# Patient Record
Sex: FEMALE | URBAN - METROPOLITAN AREA
[De-identification: names, ages, dates, MRNs, and addresses within clinical notes are randomized per-mention and may not be internally consistent; named-entity substitution may affect disease eponyms.]

---

## 2019-11-06 ENCOUNTER — APPOINTMENT (OUTPATIENT)
Age: 69
Setting detail: DERMATOLOGY
End: 2019-11-07

## 2019-11-06 DIAGNOSIS — Z80.8 FAMILY HISTORY OF MALIGNANT NEOPLASM OF OTHER ORGANS OR SYSTEMS: ICD-10-CM

## 2019-11-06 DIAGNOSIS — Z71.89 OTHER SPECIFIED COUNSELING: ICD-10-CM

## 2019-11-06 DIAGNOSIS — L82.1 OTHER SEBORRHEIC KERATOSIS: ICD-10-CM

## 2019-11-06 DIAGNOSIS — D18.0 HEMANGIOMA: ICD-10-CM

## 2019-11-06 DIAGNOSIS — B35.1 TINEA UNGUIUM: ICD-10-CM

## 2019-11-06 DIAGNOSIS — L82.0 INFLAMED SEBORRHEIC KERATOSIS: ICD-10-CM

## 2019-11-06 DIAGNOSIS — L81.4 OTHER MELANIN HYPERPIGMENTATION: ICD-10-CM

## 2019-11-06 PROBLEM — D18.01 HEMANGIOMA OF SKIN AND SUBCUTANEOUS TISSUE: Status: ACTIVE | Noted: 2019-11-06

## 2019-11-06 PROCEDURE — OTHER RECOMMENDATIONS: OTHER

## 2019-11-06 PROCEDURE — OTHER MIPS QUALITY: OTHER

## 2019-11-06 PROCEDURE — OTHER COUNSELING: OTHER

## 2019-11-06 PROCEDURE — OTHER LIQUID NITROGEN: OTHER

## 2019-11-06 PROCEDURE — OTHER DEFER: OTHER

## 2019-11-06 PROCEDURE — OTHER REASSURANCE: OTHER

## 2019-11-06 PROCEDURE — 17110 DESTRUCT B9 LESION 1-14: CPT

## 2019-11-06 PROCEDURE — 99203 OFFICE O/P NEW LOW 30 MIN: CPT | Mod: 25

## 2019-11-06 ASSESSMENT — LOCATION ZONE DERM
LOCATION ZONE: TRUNK
LOCATION ZONE: FACE
LOCATION ZONE: TOENAIL
LOCATION ZONE: ARM

## 2019-11-06 ASSESSMENT — LOCATION SIMPLE DESCRIPTION DERM
LOCATION SIMPLE: LEFT UPPER BACK
LOCATION SIMPLE: RIGHT FOREHEAD
LOCATION SIMPLE: LEFT FOREARM
LOCATION SIMPLE: RIGHT UPPER BACK
LOCATION SIMPLE: RIGHT SHOULDER
LOCATION SIMPLE: RIGHT 2ND TOE
LOCATION SIMPLE: LEFT 2ND TOE
LOCATION SIMPLE: RIGHT FOREARM

## 2019-11-06 ASSESSMENT — LOCATION DETAILED DESCRIPTION DERM
LOCATION DETAILED: RIGHT 2ND TOENAIL
LOCATION DETAILED: LEFT MID-UPPER BACK
LOCATION DETAILED: LEFT DISTAL DORSAL FOREARM
LOCATION DETAILED: LEFT 2ND TOENAIL
LOCATION DETAILED: RIGHT POSTERIOR SHOULDER
LOCATION DETAILED: RIGHT SUPERIOR UPPER BACK
LOCATION DETAILED: RIGHT DISTAL DORSAL FOREARM
LOCATION DETAILED: RIGHT FOREHEAD
LOCATION DETAILED: RIGHT INFERIOR MEDIAL UPPER BACK

## 2019-11-06 NOTE — PROCEDURE: RECOMMENDATIONS
Detail Level: Zone
Recommendation Preamble: The following recommendations were made during the visit:
Recommendations (Free Text): 50/50 water and vinegar soaks x 5 minutes 3-4 nights weekly. Patient brought me a compound medication that her pharmacist at [facility] said they can compound. Patient deferred me sending rx for this compound at this time.

## 2019-11-06 NOTE — PROCEDURE: MIPS QUALITY
Name And Contact Information For Health Care Proxy: Spouse Daniel gonzalez [phone] Name And Contact Information For Health Care Proxy: Spouse Daniel gonzalez [phone]

## 2019-11-06 NOTE — PROCEDURE: LIQUID NITROGEN
Include Z78.9 (Other Specified Conditions Influencing Health Status) As An Associated Diagnosis?: No
Consent: The patient's consent was obtained including but not limited to risks of crusting, scabbing, blistering, scarring, darker or lighter pigmentary change, recurrence, incomplete removal and infection.
Number Of Freeze-Thaw Cycles: 3 freeze-thaw cycles
Duration Of Freeze Thaw-Cycle (Seconds): 5-10
Detail Level: Detailed
Post-Care Instructions: I reviewed with the patient in detail post-care instructions. Patient is to wear sunprotection, and avoid picking at any of the treated lesions. Pt may apply Vaseline to crusted or scabbing areas.
Medical Necessity Information: It is in your best interest to select a reason for this procedure from the list below. All of these items fulfill various CMS LCD requirements except the new and changing color options.
Medical Necessity Clause: This procedure was medically necessary because the lesions that were treated were:

## 2020-02-12 ENCOUNTER — APPOINTMENT (OUTPATIENT)
Age: 70
Setting detail: DERMATOLOGY
End: 2020-02-13

## 2020-02-12 DIAGNOSIS — Z71.89 OTHER SPECIFIED COUNSELING: ICD-10-CM

## 2020-02-12 DIAGNOSIS — M71 OTHER BURSOPATHIES: ICD-10-CM

## 2020-02-12 DIAGNOSIS — R21 RASH AND OTHER NONSPECIFIC SKIN ERUPTION: ICD-10-CM

## 2020-02-12 DIAGNOSIS — L71.8 OTHER ROSACEA: ICD-10-CM

## 2020-02-12 PROBLEM — M71.342 OTHER BURSAL CYST, LEFT HAND: Status: ACTIVE | Noted: 2020-02-12

## 2020-02-12 PROCEDURE — 11103 TANGNTL BX SKIN EA SEP/ADDL: CPT

## 2020-02-12 PROCEDURE — OTHER ADDITIONAL NOTES: OTHER

## 2020-02-12 PROCEDURE — OTHER MIPS QUALITY: OTHER

## 2020-02-12 PROCEDURE — OTHER PRESCRIPTION: OTHER

## 2020-02-12 PROCEDURE — 99214 OFFICE O/P EST MOD 30 MIN: CPT | Mod: 25

## 2020-02-12 PROCEDURE — 11102 TANGNTL BX SKIN SINGLE LES: CPT

## 2020-02-12 PROCEDURE — OTHER BIOPSY BY SHAVE METHOD: OTHER

## 2020-02-12 PROCEDURE — OTHER COUNSELING: OTHER

## 2020-02-12 PROCEDURE — OTHER TREATMENT REGIMEN: OTHER

## 2020-02-12 RX ORDER — METRONIDAZOLE 7.5 MG/G
CREAM TOPICAL
Qty: 3 | Refills: 2 | Status: ERX | COMMUNITY
Start: 2020-02-12

## 2020-02-12 ASSESSMENT — LOCATION DETAILED DESCRIPTION DERM
LOCATION DETAILED: LEFT DISTAL DORSAL INDEX FINGER
LOCATION DETAILED: LEFT DISTAL PLANTAR GREAT TOE
LOCATION DETAILED: LEFT DORSAL 2ND TOE
LOCATION DETAILED: RIGHT MEDIAL MALAR CHEEK
LOCATION DETAILED: LEFT MEDIAL MALAR CHEEK

## 2020-02-12 ASSESSMENT — LOCATION ZONE DERM
LOCATION ZONE: TOE
LOCATION ZONE: FINGER
LOCATION ZONE: FACE

## 2020-02-12 ASSESSMENT — LOCATION SIMPLE DESCRIPTION DERM
LOCATION SIMPLE: LEFT 2ND TOE
LOCATION SIMPLE: LEFT CHEEK
LOCATION SIMPLE: LEFT INDEX FINGER
LOCATION SIMPLE: LEFT GREAT TOE
LOCATION SIMPLE: RIGHT CHEEK

## 2020-02-12 NOTE — PROCEDURE: MIPS QUALITY
Name And Contact Information For Health Care Proxy: Spouse Daniel gonzalez [phone] Name And Contact Information For Health Care Proxy: Spouse Daniel gonzalez [phone]

## 2020-02-12 NOTE — PROCEDURE: BIOPSY BY SHAVE METHOD
Type Of Destruction Used: Curettage
Detail Level: Detailed
Silver Nitrate Text: The wound bed was treated with silver nitrate after the biopsy was performed.
Hide Topical Anesthesia?: No
Billing Type: Third-Party Bill
Size Of Lesion In Cm: 0
Anesthesia Type: 1% lidocaine with epinephrine
Anesthesia Volume In Cc (Will Not Render If 0): 0.5
Electrodesiccation And Curettage Text: The wound bed was treated with electrodesiccation and curettage after the biopsy was performed.
Depth Of Biopsy: dermis
Was A Bandage Applied: Yes
Dressing: bandage
Wound Care: Petrolatum
Consent: Written consent was obtained and risks were reviewed including but not limited to scarring, infection, bleeding, scabbing, incomplete removal, nerve damage and allergy to anesthesia.
Post-Care Instructions: I reviewed with the patient in detail post-care instructions. Patient is to keep the biopsy site dry overnight, and then apply bacitracin twice daily until healed. Patient may apply hydrogen peroxide soaks to remove any crusting.
Hemostasis: Aluminum Chloride
Biopsy Type: H and E
Notification Instructions: Patient will be notified of biopsy results. However, patient instructed to call the office if not contacted within 2 weeks.
Cryotherapy Text: The wound bed was treated with cryotherapy after the biopsy was performed.
Curettage Text: The wound bed was treated with curettage after the biopsy was performed.
Electrodesiccation Text: The wound bed was treated with electrodesiccation after the biopsy was performed.
Biopsy Method: Personna blade

## 2020-02-12 NOTE — PROCEDURE: ADDITIONAL NOTES
Detail Level: Simple
Additional Notes: Patient states rash is asymptomatic and she is unsure how long rash has been present. She states her rheumatologist Dr. Riley would like a biopsy of the rash to rule out “a rare form of arthritis”. Will wait for results of biopsy to determine treatment plan.
Additional Notes: Patient prefers a shave biopsy over a punch biopsy. I did advise patient that we may need to eventually do a punch biopsy if the shave biopsy does not provide enough information to the pathologist.
Additional Notes: Patient states her rheumatologist states that she may have a rare form of arthritis. Patient deferred punch biopsy today. Advised patient to be evaluated by an orthopedic hand surgeon for possible excisional biopsy.

## 2020-11-09 ENCOUNTER — APPOINTMENT (OUTPATIENT)
Age: 70
Setting detail: DERMATOLOGY
End: 2020-11-10

## 2020-11-09 DIAGNOSIS — D18.0 HEMANGIOMA: ICD-10-CM

## 2020-11-09 DIAGNOSIS — L81.4 OTHER MELANIN HYPERPIGMENTATION: ICD-10-CM

## 2020-11-09 DIAGNOSIS — L82.1 OTHER SEBORRHEIC KERATOSIS: ICD-10-CM

## 2020-11-09 DIAGNOSIS — Z80.8 FAMILY HISTORY OF MALIGNANT NEOPLASM OF OTHER ORGANS OR SYSTEMS: ICD-10-CM

## 2020-11-09 DIAGNOSIS — L57.0 ACTINIC KERATOSIS: ICD-10-CM

## 2020-11-09 DIAGNOSIS — Z71.89 OTHER SPECIFIED COUNSELING: ICD-10-CM

## 2020-11-09 PROBLEM — D18.01 HEMANGIOMA OF SKIN AND SUBCUTANEOUS TISSUE: Status: ACTIVE | Noted: 2020-11-09

## 2020-11-09 PROCEDURE — OTHER REASSURANCE: OTHER

## 2020-11-09 PROCEDURE — 99213 OFFICE O/P EST LOW 20 MIN: CPT | Mod: 25

## 2020-11-09 PROCEDURE — 17000 DESTRUCT PREMALG LESION: CPT

## 2020-11-09 PROCEDURE — OTHER LIQUID NITROGEN: OTHER

## 2020-11-09 PROCEDURE — OTHER COUNSELING: OTHER

## 2020-11-09 PROCEDURE — OTHER MIPS QUALITY: OTHER

## 2020-11-09 ASSESSMENT — LOCATION SIMPLE DESCRIPTION DERM
LOCATION SIMPLE: LEFT UPPER BACK
LOCATION SIMPLE: LEFT FOREARM
LOCATION SIMPLE: RIGHT FOREARM
LOCATION SIMPLE: SCALP
LOCATION SIMPLE: RIGHT UPPER BACK

## 2020-11-09 ASSESSMENT — TOTAL NUMBER OF LESIONS: # OF LESIONS?: 1

## 2020-11-09 ASSESSMENT — LOCATION DETAILED DESCRIPTION DERM
LOCATION DETAILED: LEFT MID-UPPER BACK
LOCATION DETAILED: LEFT DISTAL DORSAL FOREARM
LOCATION DETAILED: RIGHT INFERIOR MEDIAL UPPER BACK
LOCATION DETAILED: RIGHT SUPERIOR UPPER BACK
LOCATION DETAILED: LEFT INFERIOR OCCIPITAL SCALP
LOCATION DETAILED: RIGHT DISTAL DORSAL FOREARM

## 2020-11-09 ASSESSMENT — LOCATION ZONE DERM
LOCATION ZONE: ARM
LOCATION ZONE: TRUNK
LOCATION ZONE: SCALP

## 2020-11-09 NOTE — PROCEDURE: LIQUID NITROGEN
Detail Level: Detailed
Duration Of Freeze Thaw-Cycle (Seconds): 5
Post-Care Instructions: I reviewed with the patient in detail post-care instructions. Patient is to wear sunprotection, and avoid picking at any of the treated lesions. Pt may apply Vaseline to crusted or scabbing areas.
Render Post-Care Instructions In Note?: no
Consent: The patient's consent was obtained including but not limited to risks of crusting, scabbing, blistering, scarring, darker or lighter pigmentary change, recurrence, incomplete removal and infection.
Number Of Freeze-Thaw Cycles: 3 freeze-thaw cycles

## 2020-11-09 NOTE — PROCEDURE: MIPS QUALITY
Name And Contact Information For Health Care Proxy: Spouse Daniel gonzalez [phone] Name And Contact Information For Health Care Proxy: Spouse Daniel gonzalez [phone]

## 2021-11-13 ENCOUNTER — RX ONLY (RX ONLY)
Age: 71
End: 2021-11-13

## 2021-12-13 ENCOUNTER — APPOINTMENT (OUTPATIENT)
Dept: URBAN - METROPOLITAN AREA CLINIC 277 | Age: 71
Setting detail: DERMATOLOGY
End: 2021-12-13

## 2021-12-13 DIAGNOSIS — L26 EXFOLIATIVE DERMATITIS: ICD-10-CM

## 2021-12-13 DIAGNOSIS — D18.0 HEMANGIOMA: ICD-10-CM

## 2021-12-13 DIAGNOSIS — L72.0 EPIDERMAL CYST: ICD-10-CM

## 2021-12-13 DIAGNOSIS — Z71.89 OTHER SPECIFIED COUNSELING: ICD-10-CM

## 2021-12-13 PROBLEM — D18.01 HEMANGIOMA OF SKIN AND SUBCUTANEOUS TISSUE: Status: ACTIVE | Noted: 2021-12-13

## 2021-12-13 PROCEDURE — OTHER OBSERVATION: OTHER

## 2021-12-13 PROCEDURE — OTHER COUNSELING: OTHER

## 2021-12-13 PROCEDURE — OTHER MIPS QUALITY: OTHER

## 2021-12-13 PROCEDURE — OTHER SUNSCREEN RECOMMENDATIONS: OTHER

## 2021-12-13 PROCEDURE — OTHER TREATMENT REGIMEN: OTHER

## 2021-12-13 PROCEDURE — 99213 OFFICE O/P EST LOW 20 MIN: CPT

## 2021-12-13 ASSESSMENT — LOCATION DETAILED DESCRIPTION DERM
LOCATION DETAILED: RIGHT ANTERIOR SHOULDER
LOCATION DETAILED: LEFT MEDIAL PLANTAR MIDFOOT
LOCATION DETAILED: LEFT MEDIAL SUPERIOR CHEST
LOCATION DETAILED: INFERIOR THORACIC SPINE
LOCATION DETAILED: RIGHT MEDIAL PLANTAR MIDFOOT

## 2021-12-13 ASSESSMENT — LOCATION SIMPLE DESCRIPTION DERM
LOCATION SIMPLE: RIGHT PLANTAR SURFACE
LOCATION SIMPLE: CHEST
LOCATION SIMPLE: LEFT PLANTAR SURFACE
LOCATION SIMPLE: RIGHT SHOULDER
LOCATION SIMPLE: UPPER BACK

## 2021-12-13 ASSESSMENT — LOCATION ZONE DERM
LOCATION ZONE: FEET
LOCATION ZONE: ARM
LOCATION ZONE: TRUNK

## 2021-12-13 NOTE — PROCEDURE: MIPS QUALITY
Name And Contact Information For Health Care Proxy: Spouse Daniel gonzalez [phone] Name And Contact Information For Health Care Proxy: Spouse Daniel gonzalez [phone]

## 2023-01-09 ENCOUNTER — APPOINTMENT (OUTPATIENT)
Dept: URBAN - METROPOLITAN AREA CLINIC 277 | Age: 73
Setting detail: DERMATOLOGY
End: 2023-01-09

## 2023-01-09 DIAGNOSIS — L81.4 OTHER MELANIN HYPERPIGMENTATION: ICD-10-CM

## 2023-01-09 DIAGNOSIS — Z71.89 OTHER SPECIFIED COUNSELING: ICD-10-CM

## 2023-01-09 DIAGNOSIS — D485 NEOPLASM OF UNCERTAIN BEHAVIOR OF SKIN: ICD-10-CM

## 2023-01-09 DIAGNOSIS — L57.8 OTHER SKIN CHANGES DUE TO CHRONIC EXPOSURE TO NONIONIZING RADIATION: ICD-10-CM

## 2023-01-09 DIAGNOSIS — D22 MELANOCYTIC NEVI: ICD-10-CM

## 2023-01-09 DIAGNOSIS — L82.1 OTHER SEBORRHEIC KERATOSIS: ICD-10-CM

## 2023-01-09 PROBLEM — D22.9 MELANOCYTIC NEVI, UNSPECIFIED: Status: ACTIVE | Noted: 2023-01-09

## 2023-01-09 PROBLEM — D48.5 NEOPLASM OF UNCERTAIN BEHAVIOR OF SKIN: Status: ACTIVE | Noted: 2023-01-09

## 2023-01-09 PROCEDURE — 11102 TANGNTL BX SKIN SINGLE LES: CPT

## 2023-01-09 PROCEDURE — 99213 OFFICE O/P EST LOW 20 MIN: CPT | Mod: 25

## 2023-01-09 PROCEDURE — OTHER MIPS QUALITY: OTHER

## 2023-01-09 PROCEDURE — OTHER COUNSELING: OTHER

## 2023-01-09 PROCEDURE — OTHER BIOPSY BY SHAVE METHOD: OTHER

## 2023-01-09 PROCEDURE — OTHER SUNSCREEN RECOMMENDATIONS: OTHER

## 2023-01-09 ASSESSMENT — LOCATION SIMPLE DESCRIPTION DERM
LOCATION SIMPLE: RIGHT CHEEK
LOCATION SIMPLE: RIGHT UPPER BACK
LOCATION SIMPLE: RIGHT ZYGOMA
LOCATION SIMPLE: INFERIOR FOREHEAD
LOCATION SIMPLE: LEFT ZYGOMA

## 2023-01-09 ASSESSMENT — LOCATION DETAILED DESCRIPTION DERM
LOCATION DETAILED: RIGHT LATERAL ZYGOMA
LOCATION DETAILED: RIGHT CENTRAL MALAR CHEEK
LOCATION DETAILED: INFERIOR MID FOREHEAD
LOCATION DETAILED: LEFT CENTRAL ZYGOMA
LOCATION DETAILED: RIGHT SUPERIOR UPPER BACK

## 2023-01-09 ASSESSMENT — LOCATION ZONE DERM
LOCATION ZONE: FACE
LOCATION ZONE: TRUNK

## 2023-01-09 NOTE — PROCEDURE: BIOPSY BY SHAVE METHOD
Cryotherapy Text: The wound bed was treated with cryotherapy after the biopsy was performed.
Billing Type: Third-Party Bill
Anesthesia Type: 1% lidocaine with epinephrine
Validate Triangulation: No
Was A Bandage Applied: Yes
Silver Nitrate Text: The wound bed was treated with silver nitrate after the biopsy was performed.
Wound Care: Petrolatum
Biopsy Method: Dermablade
Type Of Destruction Used: Curettage
X Size Of Lesion In Cm: 0
Curettage Text: The wound bed was treated with curettage after the biopsy was performed.
Detail Level: Detailed
Consent: Written consent was obtained and risks were reviewed including but not limited to scarring, infection, bleeding, scabbing, incomplete removal, nerve damage and allergy to anesthesia.
Information: Selecting Yes will display possible errors in your note based on the variables you have selected. This validation is only offered as a suggestion for you. PLEASE NOTE THAT THE VALIDATION TEXT WILL BE REMOVED WHEN YOU FINALIZE YOUR NOTE. IF YOU WANT TO FAX A PRELIMINARY NOTE YOU WILL NEED TO TOGGLE THIS TO 'NO' IF YOU DO NOT WANT IT IN YOUR FAXED NOTE.
Dressing: bandage
Notification Instructions: Patient will be notified of biopsy results. However, patient instructed to call the office if not contacted within 2 weeks.
Anesthesia Volume In Cc (Will Not Render If 0): 0.5
Electrodesiccation And Curettage Text: The wound bed was treated with electrodesiccation and curettage after the biopsy was performed.
Post-Care Instructions: I reviewed with the patient in detail post-care instructions. Patient is to keep the biopsy site dry overnight, and then apply bacitracin twice daily until healed. Patient may apply hydrogen peroxide soaks to remove any crusting.
Depth Of Biopsy: dermis
Electrodesiccation Text: The wound bed was treated with electrodesiccation after the biopsy was performed.
Biopsy Type: H and E
Hemostasis: Drysol

## 2023-01-09 NOTE — PROCEDURE: MIPS QUALITY
Name And Contact Information For Health Care Proxy: Spouse Daniel gonzalez [phone] Name And Contact Information For Health Care Proxy: Spouse Daniel gonzalez [phone]

## 2023-01-09 NOTE — HPI: FULL BODY SKIN EXAMINATION
----- Message from Autumn Angulo DO sent at 7/8/2020  3:31 PM CDT -----  Letter- trig mildly elevated otherwise stable  
Patient called, left message that lab letter will be mailed.     Writer prepped letter. Will have RN in clinic mail.   Once mailed and dre as sent, can close encounter.     
How Severe Are Your Spot(S)?: mild
What Type Of Note Output Would You Prefer (Optional)?: Standard Output
What Is The Reason For Today's Visit?: Full Body Skin Examination
What Is The Reason For Today's Visit? (Being Monitored For X): concerning skin lesions on a periodic basis

## 2024-01-10 ENCOUNTER — APPOINTMENT (OUTPATIENT)
Dept: URBAN - METROPOLITAN AREA CLINIC 277 | Age: 74
Setting detail: DERMATOLOGY
End: 2024-01-10

## 2024-01-10 DIAGNOSIS — D22 MELANOCYTIC NEVI: ICD-10-CM

## 2024-01-10 DIAGNOSIS — L81.8 OTHER SPECIFIED DISORDERS OF PIGMENTATION: ICD-10-CM

## 2024-01-10 DIAGNOSIS — D18.0 HEMANGIOMA: ICD-10-CM

## 2024-01-10 DIAGNOSIS — L82.1 OTHER SEBORRHEIC KERATOSIS: ICD-10-CM

## 2024-01-10 DIAGNOSIS — L91.8 OTHER HYPERTROPHIC DISORDERS OF THE SKIN: ICD-10-CM

## 2024-01-10 PROBLEM — D18.01 HEMANGIOMA OF SKIN AND SUBCUTANEOUS TISSUE: Status: ACTIVE | Noted: 2024-01-10

## 2024-01-10 PROBLEM — D22.5 MELANOCYTIC NEVI OF TRUNK: Status: ACTIVE | Noted: 2024-01-10

## 2024-01-10 PROCEDURE — OTHER TREATMENT REGIMEN: OTHER

## 2024-01-10 PROCEDURE — OTHER MIPS QUALITY: OTHER

## 2024-01-10 PROCEDURE — OTHER OBSERVATION: OTHER

## 2024-01-10 PROCEDURE — OTHER COUNSELING: OTHER

## 2024-01-10 PROCEDURE — 99213 OFFICE O/P EST LOW 20 MIN: CPT

## 2024-01-10 ASSESSMENT — LOCATION DETAILED DESCRIPTION DERM
LOCATION DETAILED: MIDDLE STERNUM
LOCATION DETAILED: LEFT AXILLARY VAULT
LOCATION DETAILED: LOWER STERNUM
LOCATION DETAILED: EPIGASTRIC SKIN
LOCATION DETAILED: UPPER STERNUM
LOCATION DETAILED: RIGHT INFERIOR MEDIAL UPPER BACK
LOCATION DETAILED: LEFT ANTERIOR PROXIMAL UPPER ARM

## 2024-01-10 ASSESSMENT — LOCATION SIMPLE DESCRIPTION DERM
LOCATION SIMPLE: LEFT AXILLARY VAULT
LOCATION SIMPLE: LEFT UPPER ARM
LOCATION SIMPLE: CHEST
LOCATION SIMPLE: ABDOMEN
LOCATION SIMPLE: RIGHT UPPER BACK

## 2024-01-10 ASSESSMENT — LOCATION ZONE DERM
LOCATION ZONE: ARM
LOCATION ZONE: TRUNK
LOCATION ZONE: AXILLAE

## 2025-01-13 ENCOUNTER — APPOINTMENT (OUTPATIENT)
Dept: URBAN - METROPOLITAN AREA CLINIC 277 | Age: 75
Setting detail: DERMATOLOGY
End: 2025-01-13

## 2025-01-13 DIAGNOSIS — Z80.8 FAMILY HISTORY OF MALIGNANT NEOPLASM OF OTHER ORGANS OR SYSTEMS: ICD-10-CM

## 2025-01-13 DIAGNOSIS — D22 MELANOCYTIC NEVI: ICD-10-CM

## 2025-01-13 DIAGNOSIS — L82.1 OTHER SEBORRHEIC KERATOSIS: ICD-10-CM

## 2025-01-13 DIAGNOSIS — L81.4 OTHER MELANIN HYPERPIGMENTATION: ICD-10-CM

## 2025-01-13 DIAGNOSIS — D18.0 HEMANGIOMA: ICD-10-CM

## 2025-01-13 DIAGNOSIS — L81.8 OTHER SPECIFIED DISORDERS OF PIGMENTATION: ICD-10-CM

## 2025-01-13 PROBLEM — D18.01 HEMANGIOMA OF SKIN AND SUBCUTANEOUS TISSUE: Status: ACTIVE | Noted: 2025-01-13

## 2025-01-13 PROBLEM — D22.5 MELANOCYTIC NEVI OF TRUNK: Status: ACTIVE | Noted: 2025-01-13

## 2025-01-13 PROCEDURE — 99213 OFFICE O/P EST LOW 20 MIN: CPT

## 2025-01-13 PROCEDURE — OTHER MIPS QUALITY: OTHER

## 2025-01-13 PROCEDURE — OTHER COUNSELING: OTHER

## 2025-01-13 PROCEDURE — OTHER OBSERVATION: OTHER

## 2025-01-13 ASSESSMENT — LOCATION DETAILED DESCRIPTION DERM
LOCATION DETAILED: LEFT ANTERIOR PROXIMAL UPPER ARM
LOCATION DETAILED: MIDDLE STERNUM
LOCATION DETAILED: UPPER STERNUM
LOCATION DETAILED: RIGHT POSTERIOR SHOULDER
LOCATION DETAILED: LEFT SUPERIOR LATERAL MIDBACK
LOCATION DETAILED: LOWER STERNUM
LOCATION DETAILED: LEFT INFERIOR CENTRAL MALAR CHEEK
LOCATION DETAILED: LEFT POSTERIOR SHOULDER
LOCATION DETAILED: LEFT RIB CAGE
LOCATION DETAILED: LEFT INFERIOR UPPER BACK

## 2025-01-13 ASSESSMENT — LOCATION SIMPLE DESCRIPTION DERM
LOCATION SIMPLE: RIGHT SHOULDER
LOCATION SIMPLE: LEFT SHOULDER
LOCATION SIMPLE: LEFT LOWER BACK
LOCATION SIMPLE: ABDOMEN
LOCATION SIMPLE: LEFT UPPER ARM
LOCATION SIMPLE: CHEST
LOCATION SIMPLE: LEFT UPPER BACK
LOCATION SIMPLE: LEFT CHEEK

## 2025-01-13 ASSESSMENT — LOCATION ZONE DERM
LOCATION ZONE: FACE
LOCATION ZONE: ARM
LOCATION ZONE: TRUNK